# Patient Record
Sex: MALE | Race: WHITE | NOT HISPANIC OR LATINO | Employment: OTHER | URBAN - NONMETROPOLITAN AREA
[De-identification: names, ages, dates, MRNs, and addresses within clinical notes are randomized per-mention and may not be internally consistent; named-entity substitution may affect disease eponyms.]

---

## 2018-12-18 ENCOUNTER — OFFICE VISIT (OUTPATIENT)
Dept: GASTROENTEROLOGY | Facility: CLINIC | Age: 73
End: 2018-12-18

## 2018-12-18 VITALS
OXYGEN SATURATION: 96 % | SYSTOLIC BLOOD PRESSURE: 128 MMHG | HEART RATE: 68 BPM | HEIGHT: 65 IN | DIASTOLIC BLOOD PRESSURE: 62 MMHG | BODY MASS INDEX: 38.79 KG/M2 | WEIGHT: 232.8 LBS

## 2018-12-18 DIAGNOSIS — Z12.11 ENCOUNTER FOR SCREENING FOR MALIGNANT NEOPLASM OF COLON: Primary | ICD-10-CM

## 2018-12-18 PROCEDURE — S0260 H&P FOR SURGERY: HCPCS | Performed by: NURSE PRACTITIONER

## 2018-12-18 RX ORDER — HYDROCHLOROTHIAZIDE 25 MG/1
TABLET ORAL
COMMUNITY
Start: 2018-12-04 | End: 2019-01-21

## 2018-12-18 RX ORDER — TAMSULOSIN HYDROCHLORIDE 0.4 MG/1
CAPSULE ORAL
COMMUNITY
Start: 2018-10-30

## 2018-12-18 RX ORDER — ATORVASTATIN CALCIUM 80 MG/1
80 TABLET, FILM COATED ORAL DAILY
COMMUNITY

## 2018-12-18 RX ORDER — ALLOPURINOL 300 MG/1
TABLET ORAL
COMMUNITY
Start: 2018-12-08

## 2018-12-18 RX ORDER — SODIUM, POTASSIUM,MAG SULFATES 17.5-3.13G
1 SOLUTION, RECONSTITUTED, ORAL ORAL EVERY 12 HOURS
Qty: 2 BOTTLE | Refills: 0 | Status: SHIPPED | OUTPATIENT
Start: 2018-12-18 | End: 2019-01-23 | Stop reason: HOSPADM

## 2018-12-18 RX ORDER — AMLODIPINE BESYLATE 5 MG/1
TABLET ORAL
COMMUNITY
Start: 2018-11-20

## 2018-12-18 RX ORDER — TEMAZEPAM 30 MG/1
30 CAPSULE ORAL NIGHTLY PRN
COMMUNITY

## 2018-12-18 RX ORDER — NITROGLYCERIN 0.4 MG/1
TABLET SUBLINGUAL
COMMUNITY
Start: 2018-11-09

## 2018-12-18 RX ORDER — FINASTERIDE 5 MG/1
TABLET, FILM COATED ORAL
COMMUNITY
Start: 2018-11-14

## 2018-12-18 RX ORDER — WARFARIN SODIUM 2.5 MG/1
TABLET ORAL
COMMUNITY
Start: 2018-12-04

## 2018-12-18 RX ORDER — LOSARTAN POTASSIUM 100 MG/1
TABLET ORAL
COMMUNITY
Start: 2018-10-31

## 2018-12-18 RX ORDER — DEXTROSE AND SODIUM CHLORIDE 5; .45 G/100ML; G/100ML
30 INJECTION, SOLUTION INTRAVENOUS CONTINUOUS PRN
Status: CANCELLED | OUTPATIENT
Start: 2019-01-23

## 2018-12-18 RX ORDER — ATENOLOL 50 MG/1
50 TABLET ORAL 2 TIMES DAILY
COMMUNITY
Start: 2018-10-03

## 2018-12-18 NOTE — PROGRESS NOTES
Chief Complaint   Patient presents with   • Colon Cancer Screening       Subjective    Everett Rodriguez is a 73 y.o. male. he is being seen for consultation today at the request of Dr. Harry     History of Present Illness  73-year-old male presents to discuss screening colonoscopy. he denies any abdominal pain, nausea, vomiting or changes in his bowel habits.  Denies any melena or hematochezia.  His weight is stable.  He is on Coumadin per Dr. Harry due to coronary artery disease.  Initial colonoscopy in 2002 noted adenomatous polyp in rectum.  Repeat colonoscopy 08 was normal.  He denies any family history of colorectal cancer  Plan; schedule patient for screening colonoscopy        The following portions of the patient's history were reviewed and updated as appropriate:   Past Medical History:   Diagnosis Date   • Coronary artery disease    • Diabetes mellitus (CMS/HCC)    • Hyperlipidemia    • Hypertension      Past Surgical History:   Procedure Laterality Date   • CARDIAC CATHETERIZATION     • COLONOSCOPY  2008     No family history on file.    Current Outpatient Medications   Medication Sig Dispense Refill   • allopurinol (ZYLOPRIM) 300 MG tablet      • amLODIPine (NORVASC) 5 MG tablet      • atenolol (TENORMIN) 50 MG tablet      • atorvastatin (LIPITOR) 80 MG tablet Take 80 mg by mouth Daily.     • finasteride (PROSCAR) 5 MG tablet      • hydrochlorothiazide (HYDRODIURIL) 25 MG tablet      • losartan (COZAAR) 100 MG tablet      • metFORMIN (GLUCOPHAGE) 500 MG tablet      • nitroglycerin (NITROSTAT) 0.4 MG SL tablet      • tamsulosin (FLOMAX) 0.4 MG capsule 24 hr capsule      • temazepam (RESTORIL) 30 MG capsule Take 30 mg by mouth At Night As Needed for Sleep.     • warfarin (COUMADIN) 2.5 MG tablet      • sodium-potassium-magnesium sulfates (SUPREP BOWEL PREP KIT) 17.5-3.13-1.6 GM/177ML solution oral solution Take 1 bottle by mouth Every 12 (Twelve) Hours. 2 bottle 0     No current  "facility-administered medications for this visit.      No Known Allergies  Social History     Socioeconomic History   • Marital status: Single     Spouse name: Not on file   • Number of children: Not on file   • Years of education: Not on file   • Highest education level: Not on file       Review of Systems  Review of Systems   Constitutional: Negative for activity change, appetite change, chills, diaphoresis, fatigue, fever and unexpected weight change.   HENT: Negative for sore throat and trouble swallowing.    Respiratory: Negative for shortness of breath.    Gastrointestinal: Negative for abdominal distention, abdominal pain, anal bleeding, blood in stool, constipation, diarrhea, nausea, rectal pain and vomiting.   Musculoskeletal: Negative for arthralgias.   Skin: Negative for pallor.   Neurological: Negative for light-headedness.        /62 (BP Location: Left arm)   Pulse 68   Ht 165.1 cm (65\")   Wt 106 kg (232 lb 12.8 oz)   SpO2 96%   BMI 38.74 kg/m²     Objective    Physical Exam   Constitutional: He is oriented to person, place, and time. He appears well-developed and well-nourished. He is cooperative. No distress.   HENT:   Head: Normocephalic and atraumatic.   Neck: Normal range of motion. Neck supple. No thyromegaly present.   Cardiovascular: Normal rate and regular rhythm.   Murmur heard.  Pulmonary/Chest: Effort normal and breath sounds normal. He has no wheezes. He has no rhonchi. He has no rales.   Abdominal: Soft. Normal appearance and bowel sounds are normal. He exhibits no shifting dullness, no distension, no fluid wave and no ascites. There is no hepatosplenomegaly. There is no tenderness. There is no rigidity and no guarding. No hernia.   Lymphadenopathy:     He has no cervical adenopathy.   Neurological: He is alert and oriented to person, place, and time.   Skin: Skin is warm, dry and intact. No rash noted. No pallor.   Psychiatric: He has a normal mood and affect. His speech is " normal.     Hospital Outpatient Visit on 09/11/2014   Component Date Value Ref Range Status   • Color, UA 09/11/2014 RED   Final   • Appearance 09/11/2014 TURBID   Final   • Specific Gravity, UA 09/11/2014 1.019  1.003 - 1.030 Final   • pH, UA 09/11/2014 6.0  pH Units Final    Comment: DF by IF @ 09/11/2014 15:13  pH Normal: 5.0 - 9.0      • Leukocytes, UA 09/11/2014 3+* NEGATIVE Final   • Nitrite, UA 09/11/2014 POSITIVE* NEGATIVE Final   • Protein, UA 09/11/2014 3+* NEGATIVE Final   • Glucose, Urine 09/11/2014 NEGATIVE  NEGATIVE mg/dl Final   • Ketones, UA 09/11/2014 2+* NEGATIVE Final   • Urobilinogen, UA 09/11/2014 1.0* 0.2 EU/dl Final   • Blood, UA 09/11/2014 3+* NEGATIVE Final   • WBC, UA 09/11/2014 50-60* 0 - 5  /HPF Final   • RBC, UA 09/11/2014 >100* 0 - 2  /HPF Final   • Bacteria, UA 09/11/2014 TRACE* 0  /HPF Final   • Mucus, UA 09/11/2014 TRACE   Final   • Epithelial Cells, UA 09/11/2014 Rare Squamous   /HPF Final   • Crystals, Joint Fluid 09/11/2014 None Observed   /HPF Final   • Casts 09/11/2014 0   /LPF Final   • Sodium 09/11/2014 142  137 - 145 mmol/L Final   • Potassium 09/11/2014 4.2  3.5 - 5.1 mmol/L Final   • Chloride 09/11/2014 96  95 - 110 mmol/L Final   • CO2 09/11/2014 32* 22 - 31 mmol/L Final   • Anion Gap 09/11/2014 14.0  5.0 - 15.0 mmol/L Final   • Glucose 09/11/2014 147* 60 - 100 mg/dl Final   • BUN 09/11/2014 32* 7 - 21 mg/dl Final   • Creatinine 09/11/2014 1.2  0.7 - 1.3 mg/dl Final   • GFR MDRD Non  09/11/2014 60  49 - 113 mL/min/1.73 sq.M Final    Comment: Invalid if creatinine is changing or the patient is on dialysis.  Use AA result if patient is -American, non AA result otherwise.     • GFR MDRD  09/11/2014 73  49 - 113 mL/min/1.73 sq.M Final   • Calcium 09/11/2014 9.8  8.4 - 10.2 mg/dl Final   • Total Protein 09/11/2014 7.7  6.3 - 8.6 gm/dl Final   • Albumin 09/11/2014 4.1  3.4 - 4.8 gm/dl Final   • Total Bilirubin 09/11/2014 1.1  0.2 - 1.3  mg/dl Final   • Alkaline Phosphatase 09/11/2014 176* 38 - 126 U/L Final   • AST (SGOT) 09/11/2014 98* 17 - 59 U/L Final   • ALT (SGPT) 09/11/2014 89* 21 - 72 U/L Final   • WBC 09/11/2014 13.8* 3.2 - 9.8 x1000/uL Final   • RBC 09/11/2014 4.58  4.37 - 5.74 juan/mm3 Final   • Hemoglobin 09/11/2014 14.6  13.7 - 17.3 gm/dl Final   • Hematocrit 09/11/2014 41.4  39.0 - 49.0 % Final   • MCV 09/11/2014 90.4  80.0 - 98.0 fl Final   • MCH 09/11/2014 31.9  26.0 - 34.0 pg Final   • MCHC 09/11/2014 35.3  31.5 - 36.3 gm/dl Final   • RDW 09/11/2014 13.0  11.5 - 14.5 % Final   • Platelets 09/11/2014 131* 150 - 450 x1000/mm3 Final   • MPV 09/11/2014 10.8  8.0 - 12.0 fl Final   • Neutrophil Rel % 09/11/2014 80.6* 37.0 - 80.0 % Final   • Lymphocyte Rel % 09/11/2014 8.9* 10.0 - 50.0 % Final   • Monocyte Rel % 09/11/2014 7.8  0.0 - 12.0 % Final   • Eosinophil Rel % 09/11/2014 1.6  0.0 - 7.0 % Final   • Basophil Rel % 09/11/2014 0.4  0.0 - 2.0 % Final   • Immature Granulocyte Rel % 09/11/2014 0.70* 0.00 - 0.50 % Final   • Neutrophils Absolute 09/11/2014 11.13* 2.00 - 8.60 x1000/uL Final   • Lymphocytes Absolute 09/11/2014 1.23  0.60 - 4.20 x1000/uL Final   • Monocytes Absolute 09/11/2014 1.08* 0.00 - 0.90 x1000/uL Final   • Eosinophils Absolute 09/11/2014 0.22  0.00 - 0.70 x1000/uL Final   • Basophils Absolute 09/11/2014 0.05  0.00 - 0.20 x1000/uL Final   • Immature Granulocytes Absolute 09/11/2014 0.090* 0.005 - 0.022 x1000/uL Final   • PTT 09/11/2014 30.2  22.5 - 36.7 seconds Final    Comment: DF by IF @ 09/11/2014 15:28  The recommended Heparin therapeutic range is 68 - 97 seconds.     • Protime 09/11/2014 13.9  11.1 - 15.3 seconds Final   • INR 09/11/2014 1.1  0.0 - 3.5 Final                                   Assessment/Plan      1. Encounter for screening for malignant neoplasm of colon    .       Orders placed during this encounter include:  Orders Placed This Encounter   Procedures   • Follow Anesthesia Guidelines / Standing Orders      Standing Status:   Future       COLONOSCOPY (N/A)    Review and/or summary of lab tests, radiology, procedures, medications. Review and summary of old records and obtaining of history. The risks and benefits of my recommendations, as well as other treatment options were discussed with the patient today. Questions were answered.    New Medications Ordered This Visit   Medications   • sodium-potassium-magnesium sulfates (SUPREP BOWEL PREP KIT) 17.5-3.13-1.6 GM/177ML solution oral solution     Sig: Take 1 bottle by mouth Every 12 (Twelve) Hours.     Dispense:  2 bottle     Refill:  0       Follow-up: Return for Recheck after Procedure .          This document has been electronically signed by YOHAN Spring on December 18, 2018 11:06 AM             Results for orders placed or performed during the hospital encounter of 09/11/14   Urinalysis, Microscopic only   Result Value Ref Range    WBC, UA 50-60 (A) 0 - 5  /HPF    RBC, UA >100 (A) 0 - 2  /HPF    Bacteria, UA TRACE (A) 0  /HPF    Mucus, UA TRACE     Epithelial Cells, UA Rare Squamous  /HPF    Crystals, Joint Fluid None Observed  /HPF    Casts 0  /LPF   Urinalysis Without Microscopic   Result Value Ref Range    Color, UA RED     Appearance TURBID     Specific Gravity, UA 1.019 1.003 - 1.030    pH, UA 6.0 pH Units    Leukocytes, UA 3+ (A) NEGATIVE    Nitrite, UA POSITIVE (A) NEGATIVE    Protein, UA 3+ (A) NEGATIVE    Glucose, Urine NEGATIVE NEGATIVE mg/dl    Ketones, UA 2+ (A) NEGATIVE    Urobilinogen, UA 1.0 (A) 0.2 EU/dl    Blood, UA 3+ (A) NEGATIVE   APTT   Result Value Ref Range    PTT 30.2 22.5 - 36.7 seconds   Protime-INR   Result Value Ref Range    Protime 13.9 11.1 - 15.3 seconds    INR 1.1 0.0 - 3.5   CBC and Differential   Result Value Ref Range    WBC 13.8 (H) 3.2 - 9.8 x1000/uL    RBC 4.58 4.37 - 5.74 juan/mm3    Hemoglobin 14.6 13.7 - 17.3 gm/dl    Hematocrit 41.4 39.0 - 49.0 %    MCV 90.4 80.0 - 98.0 fl    MCH 31.9 26.0 - 34.0 pg    MCHC 35.3  31.5 - 36.3 gm/dl    RDW 13.0 11.5 - 14.5 %    Platelets 131 (L) 150 - 450 x1000/mm3    MPV 10.8 8.0 - 12.0 fl    Neutrophil Rel % 80.6 (H) 37.0 - 80.0 %    Lymphocyte Rel % 8.9 (L) 10.0 - 50.0 %    Monocyte Rel % 7.8 0.0 - 12.0 %    Eosinophil Rel % 1.6 0.0 - 7.0 %    Basophil Rel % 0.4 0.0 - 2.0 %    Immature Granulocyte Rel % 0.70 (H) 0.00 - 0.50 %    Neutrophils Absolute 11.13 (H) 2.00 - 8.60 x1000/uL    Lymphocytes Absolute 1.23 0.60 - 4.20 x1000/uL    Monocytes Absolute 1.08 (H) 0.00 - 0.90 x1000/uL    Eosinophils Absolute 0.22 0.00 - 0.70 x1000/uL    Basophils Absolute 0.05 0.00 - 0.20 x1000/uL    Immature Granulocytes Absolute 0.090 (H) 0.005 - 0.022 x1000/uL   Comprehensive metabolic panel   Result Value Ref Range    Sodium 142 137 - 145 mmol/L    Potassium 4.2 3.5 - 5.1 mmol/L    Chloride 96 95 - 110 mmol/L    CO2 32 (H) 22 - 31 mmol/L    Anion Gap 14.0 5.0 - 15.0 mmol/L    Glucose 147 (H) 60 - 100 mg/dl    BUN 32 (H) 7 - 21 mg/dl    Creatinine 1.2 0.7 - 1.3 mg/dl    GFR MDRD Non  60 49 - 113 mL/min/1.73 sq.M    GFR MDRD  73 49 - 113 mL/min/1.73 sq.M    Calcium 9.8 8.4 - 10.2 mg/dl    Total Protein 7.7 6.3 - 8.6 gm/dl    Albumin 4.1 3.4 - 4.8 gm/dl    Total Bilirubin 1.1 0.2 - 1.3 mg/dl    Alkaline Phosphatase 176 (H) 38 - 126 U/L    AST (SGOT) 98 (H) 17 - 59 U/L    ALT (SGPT) 89 (H) 21 - 72 U/L   Results for orders placed or performed in visit on 05/23/14   Converted Surgical Pathology   Result Value Ref Range    Spec Descr 1 SPECIMEN(S): A LESION LEFT INFRAORBITAL     Preoperative Diagnosis   PREOPERATIVE DIAGNOSIS:  None Given       Postoperative Diagnosis         POSTOPERATIVE DIAGNOSIS:  (L) infraorbital skin lesion      Gross Description         GROSS DESCRIPTION:  The specimen consists of an oval shaped skin from the left infraorbital  area measuring 2.0 x 1.5 cm and cut to a depth of 1.0 cm.  The skin  surface shows a raised firm grayish tan nodule measuring 1.0 cm  "in  greatest dimension and is about 0.2 cm away from the closest line of  excision.  Serially sectioned and all embedded.      Final Diagnosis         FINAL DIAGNOSIS:  SKIN, LEFT INFRAORBITAL AREA, EXCISIONAL BIOPSY:       SEBACEOUS ADENOMA.      CONVERTED (HISTORICAL) FINAL PATHOLOGIST       Diagnostician:  CHRISTIANO JOHN M.D.  Pathologist  Electronically Signed 05/28/2014      Diagnosis Code   DIAGNOSIS CODE:  8      Results for orders placed or performed in visit on 03/01/05   Converted Surgical Pathology   Result Value Ref Range    Spec Descr 1 SPECIMEN(S): A CYST     Gross Description         GROSS DESCRIPTION:  The specimen is labeled \"#1 cyst, right thumb\" and consists of a  fusiform skin fragment measuring 1.3 x 0.6 x 0.4 cm.  This is serially  sectioned and entirely submitted in 1 block after the margins are marked  with ink.  Two fragments of gray-tan tissue measure 0.5 x 0.4 x 0.3 cm  each.  These are included without sectioning in the same block.      Final Diagnosis         FINAL DIAGNOSIS:  SKIN, RIGHT THUMB:            DIGITAL MUCOUS CYST.                           [facility]    DIAGNOSIS CODE:  3      Comment   CLINICAL DIAGNOSIS:  Cyst       CONVERTED (HISTORICAL) FINAL PATHOLOGIST       Diagnostician:  LORY ARMAS M.D.  Pathologist  Electronically Signed 03/03/2005     Results for orders placed or performed in visit on 10/03/02   Converted Surgical Pathology   Result Value Ref Range    Spec Descr 1 SPECIMEN(S): A RECTAL BIOPSY     Gross Description         GROSS DESCRIPTION:  The container is labeled \"rectum\" and has a fragment of white soft  tissue 0.4 cm in greatest dimension.  It is entirely embedded.      Final Diagnosis         FINAL DIAGNOSIS:  MUCOSA, RECTUM:              TUBULAR ADENOMA.            pgl    DIAGNOSIS CODE:  8      Comment   CLINICAL DIAGNOSIS:  Rectal biopsy       CONVERTED (HISTORICAL) FINAL PATHOLOGIST       Diagnostician:  DEE JOY M.D.  Pathologist  Electronically " Signed 10/07/2002

## 2018-12-18 NOTE — PATIENT INSTRUCTIONS
Colonoscopy, Adult  A colonoscopy is an exam to look at the entire large intestine. During the exam, a lubricated, bendable tube is inserted into the anus and then passed into the rectum, colon, and other parts of the large intestine.  A colonoscopy is often done as a part of normal colorectal screening or in response to certain symptoms, such as anemia, persistent diarrhea, abdominal pain, and blood in the stool. The exam can help screen for and diagnose medical problems, including:  · Tumors.  · Polyps.  · Inflammation.  · Areas of bleeding.    Tell a health care provider about:  · Any allergies you have.  · All medicines you are taking, including vitamins, herbs, eye drops, creams, and over-the-counter medicines.  · Any problems you or family members have had with anesthetic medicines.  · Any blood disorders you have.  · Any surgeries you have had.  · Any medical conditions you have.  · Any problems you have had passing stool.  What are the risks?  Generally, this is a safe procedure. However, problems may occur, including:  · Bleeding.  · A tear in the intestine.  · A reaction to medicines given during the exam.  · Infection (rare).    What happens before the procedure?  Eating and drinking restrictions  Follow instructions from your health care provider about eating and drinking, which may include:  · A few days before the procedure - follow a low-fiber diet. Avoid nuts, seeds, dried fruit, raw fruits, and vegetables.  · 1-3 days before the procedure - follow a clear liquid diet. Drink only clear liquids, such as clear broth or bouillon, black coffee or tea, clear juice, clear soft drinks or sports drinks, gelatin dessert, and popsicles. Avoid any liquids that contain red or purple dye.  · On the day of the procedure - do not eat or drink anything during the 2 hours before the procedure, or within the time period that your health care provider recommends.    Bowel prep  If you were prescribed an oral bowel prep  to clean out your colon:  · Take it as told by your health care provider. Starting the day before your procedure, you will need to drink a large amount of medicated liquid. The liquid will cause you to have multiple loose stools until your stool is almost clear or light green.  · If your skin or anus gets irritated from diarrhea, you may use these to relieve the irritation:  ? Medicated wipes, such as adult wet wipes with aloe and vitamin E.  ? A skin soothing-product like petroleum jelly.  · If you vomit while drinking the bowel prep, take a break for up to 60 minutes and then begin the bowel prep again. If vomiting continues and you cannot take the bowel prep without vomiting, call your health care provider.    General instructions  · Ask your health care provider about changing or stopping your regular medicines. This is especially important if you are taking diabetes medicines or blood thinners.  · Plan to have someone take you home from the hospital or clinic.  What happens during the procedure?  · An IV tube may be inserted into one of your veins.  · You will be given medicine to help you relax (sedative).  · To reduce your risk of infection:  ? Your health care team will wash or sanitize their hands.  ? Your anal area will be washed with soap.  · You will be asked to lie on your side with your knees bent.  · Your health care provider will lubricate a long, thin, flexible tube. The tube will have a camera and a light on the end.  · The tube will be inserted into your anus.  · The tube will be gently eased through your rectum and colon.  · Air will be delivered into your colon to keep it open. You may feel some pressure or cramping.  · The camera will be used to take images during the procedure.  · A small tissue sample may be removed from your body to be examined under a microscope (biopsy). If any potential problems are found, the tissue will be sent to a lab for testing.  · If small polyps are found, your  health care provider may remove them and have them checked for cancer cells.  · The tube that was inserted into your anus will be slowly removed.  The procedure may vary among health care providers and hospitals.  What happens after the procedure?  · Your blood pressure, heart rate, breathing rate, and blood oxygen level will be monitored until the medicines you were given have worn off.  · Do not drive for 24 hours after the exam.  · You may have a small amount of blood in your stool.  · You may pass gas and have mild abdominal cramping or bloating due to the air that was used to inflate your colon during the exam.  · It is up to you to get the results of your procedure. Ask your health care provider, or the department performing the procedure, when your results will be ready.  This information is not intended to replace advice given to you by your health care provider. Make sure you discuss any questions you have with your health care provider.  Document Released: 12/15/2001 Document Revised: 10/18/2017 Document Reviewed: 02/28/2017  [employer] Patient Education © 2018 [employer]

## 2019-01-21 RX ORDER — HYDROCHLOROTHIAZIDE 25 MG/1
25 TABLET ORAL DAILY
COMMUNITY

## 2019-01-21 RX ORDER — HYDRALAZINE HYDROCHLORIDE 50 MG/1
50 TABLET, FILM COATED ORAL 3 TIMES DAILY
COMMUNITY

## 2019-01-23 ENCOUNTER — HOSPITAL ENCOUNTER (OUTPATIENT)
Facility: HOSPITAL | Age: 74
Setting detail: HOSPITAL OUTPATIENT SURGERY
Discharge: HOME | End: 2019-01-23
Attending: INTERNAL MEDICINE | Admitting: INTERNAL MEDICINE

## 2019-01-23 ENCOUNTER — ANESTHESIA EVENT (OUTPATIENT)
Dept: GASTROENTEROLOGY | Facility: HOSPITAL | Age: 74
End: 2019-01-23

## 2019-01-23 ENCOUNTER — ANESTHESIA (OUTPATIENT)
Dept: GASTROENTEROLOGY | Facility: HOSPITAL | Age: 74
End: 2019-01-23

## 2019-01-23 VITALS
HEIGHT: 65 IN | BODY MASS INDEX: 37.55 KG/M2 | HEART RATE: 75 BPM | SYSTOLIC BLOOD PRESSURE: 134 MMHG | TEMPERATURE: 98.1 F | OXYGEN SATURATION: 95 % | RESPIRATION RATE: 19 BRPM | DIASTOLIC BLOOD PRESSURE: 65 MMHG | WEIGHT: 225.38 LBS

## 2019-01-23 DIAGNOSIS — Z12.11 ENCOUNTER FOR SCREENING FOR MALIGNANT NEOPLASM OF COLON: ICD-10-CM

## 2019-01-23 LAB — GLUCOSE BLDC GLUCOMTR-MCNC: 112 MG/DL (ref 70–130)

## 2019-01-23 PROCEDURE — 88305 TISSUE EXAM BY PATHOLOGIST: CPT | Performed by: PATHOLOGY

## 2019-01-23 PROCEDURE — 88305 TISSUE EXAM BY PATHOLOGIST: CPT | Performed by: INTERNAL MEDICINE

## 2019-01-23 PROCEDURE — 82962 GLUCOSE BLOOD TEST: CPT

## 2019-01-23 PROCEDURE — 45385 COLONOSCOPY W/LESION REMOVAL: CPT | Performed by: INTERNAL MEDICINE

## 2019-01-23 PROCEDURE — 25010000002 PROPOFOL 10 MG/ML EMULSION: Performed by: NURSE ANESTHETIST, CERTIFIED REGISTERED

## 2019-01-23 RX ORDER — DEXTROSE AND SODIUM CHLORIDE 5; .45 G/100ML; G/100ML
30 INJECTION, SOLUTION INTRAVENOUS CONTINUOUS PRN
Status: DISCONTINUED | OUTPATIENT
Start: 2019-01-23 | End: 2019-01-23 | Stop reason: HOSPADM

## 2019-01-23 RX ORDER — PROPOFOL 10 MG/ML
VIAL (ML) INTRAVENOUS AS NEEDED
Status: DISCONTINUED | OUTPATIENT
Start: 2019-01-23 | End: 2019-01-23 | Stop reason: SURG

## 2019-01-23 RX ADMIN — PROPOFOL 40 MG: 10 INJECTION, EMULSION INTRAVENOUS at 15:36

## 2019-01-23 RX ADMIN — PROPOFOL 40 MG: 10 INJECTION, EMULSION INTRAVENOUS at 15:28

## 2019-01-23 RX ADMIN — PROPOFOL 60 MG: 10 INJECTION, EMULSION INTRAVENOUS at 15:24

## 2019-01-23 RX ADMIN — PROPOFOL 40 MG: 10 INJECTION, EMULSION INTRAVENOUS at 15:32

## 2019-01-23 RX ADMIN — DEXTROSE AND SODIUM CHLORIDE 30 ML/HR: 5; 450 INJECTION, SOLUTION INTRAVENOUS at 15:03

## 2019-01-23 NOTE — H&P
No chief complaint on file.      Subjective    Everett Rodriguez is a 73 y.o. male. he is being seen for consultation today at the request of Dr. Harry     History of Present Illness  73-year-old male presents to discuss screening colonoscopy. he denies any abdominal pain, nausea, vomiting or changes in his bowel habits.  Denies any melena or hematochezia.  His weight is stable.  He is on Coumadin per Dr. Harry due to coronary artery disease.  Initial colonoscopy in 2002 noted adenomatous polyp in rectum.  Repeat colonoscopy 08 was normal.  He denies any family history of colorectal cancer  Plan; schedule patient for screening colonoscopy        The following portions of the patient's history were reviewed and updated as appropriate:   Past Medical History:   Diagnosis Date   • Coronary artery disease    • Diabetes mellitus (CMS/HCC)    • Hyperlipidemia    • Hypertension      Past Surgical History:   Procedure Laterality Date   • CARDIAC CATHETERIZATION     • COLONOSCOPY  2008     History reviewed. No pertinent family history.    No current facility-administered medications for this encounter.      No Known Allergies  Social History     Socioeconomic History   • Marital status: Single     Spouse name: Not on file   • Number of children: Not on file   • Years of education: Not on file   • Highest education level: Not on file   Tobacco Use   • Smoking status: Never Smoker   • Smokeless tobacco: Never Used   Substance and Sexual Activity   • Alcohol use: Yes     Comment: occasional   • Drug use: No     Prior to Admission medications    Medication Sig Start Date End Date Taking? Authorizing Provider   allopurinol (ZYLOPRIM) 300 MG tablet  12/8/18  Yes ProviderTanner MD   amLODIPine (NORVASC) 5 MG tablet  11/20/18  Yes ProviderTanner MD   atenolol (TENORMIN) 50 MG tablet Take 50 mg by mouth 2 (Two) Times a Day. 10/3/18  Yes ProviderTanner MD   atorvastatin (LIPITOR) 80 MG tablet Take 80 mg  "by mouth Daily.   Yes ProviderTanner MD   finasteride (PROSCAR) 5 MG tablet  11/14/18  Yes ProviderTanner MD   hydrALAZINE (APRESOLINE) 50 MG tablet Take 50 mg by mouth 3 (Three) Times a Day.   Yes Provider, MD Tanner   hydrochlorothiazide (HYDRODIURIL) 25 MG tablet Take 25 mg by mouth Daily.   Yes Tanner Barrientos MD   losartan (COZAAR) 100 MG tablet  10/31/18  Yes Tanner Barrientos MD   metFORMIN (GLUCOPHAGE) 500 MG tablet  12/8/18  Yes Provider, MD Tanner   tamsulosin (FLOMAX) 0.4 MG capsule 24 hr capsule  10/30/18  Yes Tanner Barrientos MD   temazepam (RESTORIL) 30 MG capsule Take 30 mg by mouth At Night As Needed for Sleep.   Yes Tanner Barrientos MD   nitroglycerin (NITROSTAT) 0.4 MG SL tablet  11/9/18   Tanner Barrientos MD   warfarin (COUMADIN) 2.5 MG tablet  12/4/18   ProviderTanner MD       Review of Systems  Review of Systems   Constitutional: Negative for activity change, appetite change, chills, diaphoresis, fatigue, fever and unexpected weight change.   HENT: Negative for sore throat and trouble swallowing.    Respiratory: Negative for shortness of breath.    Gastrointestinal: Negative for abdominal distention, abdominal pain, anal bleeding, blood in stool, constipation, diarrhea, nausea, rectal pain and vomiting.   Musculoskeletal: Negative for arthralgias.   Skin: Negative for pallor.   Neurological: Negative for light-headedness.        Ht 165.1 cm (65\")   Wt 104 kg (230 lb)   BMI 38.27 kg/m²     Objective    Physical Exam   Constitutional: He is oriented to person, place, and time. He appears well-developed and well-nourished. He is cooperative. No distress.   HENT:   Head: Normocephalic and atraumatic.   Neck: Normal range of motion. Neck supple. No thyromegaly present.   Cardiovascular: Normal rate and regular rhythm.   Murmur heard.  Pulmonary/Chest: Effort normal and breath sounds normal. He has no wheezes. He has no rhonchi. He has no " rales.   Abdominal: Soft. Normal appearance and bowel sounds are normal. He exhibits no shifting dullness, no distension, no fluid wave and no ascites. There is no hepatosplenomegaly. There is no tenderness. There is no rigidity and no guarding. No hernia.   Lymphadenopathy:     He has no cervical adenopathy.   Neurological: He is alert and oriented to person, place, and time.   Skin: Skin is warm, dry and intact. No rash noted. No pallor.   Psychiatric: He has a normal mood and affect. His speech is normal.     Hospital Outpatient Visit on 09/11/2014   Component Date Value Ref Range Status   • Color, UA 09/11/2014 RED   Final   • Appearance 09/11/2014 TURBID   Final   • Specific Gravity, UA 09/11/2014 1.019  1.003 - 1.030 Final   • pH, UA 09/11/2014 6.0  pH Units Final    Comment: DF by IF @ 09/11/2014 15:13  pH Normal: 5.0 - 9.0      • Leukocytes, UA 09/11/2014 3+* NEGATIVE Final   • Nitrite, UA 09/11/2014 POSITIVE* NEGATIVE Final   • Protein, UA 09/11/2014 3+* NEGATIVE Final   • Glucose, Urine 09/11/2014 NEGATIVE  NEGATIVE mg/dl Final   • Ketones, UA 09/11/2014 2+* NEGATIVE Final   • Urobilinogen, UA 09/11/2014 1.0* 0.2 EU/dl Final   • Blood, UA 09/11/2014 3+* NEGATIVE Final   • WBC, UA 09/11/2014 50-60* 0 - 5  /HPF Final   • RBC, UA 09/11/2014 >100* 0 - 2  /HPF Final   • Bacteria, UA 09/11/2014 TRACE* 0  /HPF Final   • Mucus, UA 09/11/2014 TRACE   Final   • Epithelial Cells, UA 09/11/2014 Rare Squamous   /HPF Final   • Crystals, Joint Fluid 09/11/2014 None Observed   /HPF Final   • Casts 09/11/2014 0   /LPF Final   • Sodium 09/11/2014 142  137 - 145 mmol/L Final   • Potassium 09/11/2014 4.2  3.5 - 5.1 mmol/L Final   • Chloride 09/11/2014 96  95 - 110 mmol/L Final   • CO2 09/11/2014 32* 22 - 31 mmol/L Final   • Anion Gap 09/11/2014 14.0  5.0 - 15.0 mmol/L Final   • Glucose 09/11/2014 147* 60 - 100 mg/dl Final   • BUN 09/11/2014 32* 7 - 21 mg/dl Final   • Creatinine 09/11/2014 1.2  0.7 - 1.3 mg/dl Final   • GFR  MDRD Non  09/11/2014 60  49 - 113 mL/min/1.73 sq.M Final    Comment: Invalid if creatinine is changing or the patient is on dialysis.  Use AA result if patient is -American, non AA result otherwise.     • GFR MDRD  09/11/2014 73  49 - 113 mL/min/1.73 sq.M Final   • Calcium 09/11/2014 9.8  8.4 - 10.2 mg/dl Final   • Total Protein 09/11/2014 7.7  6.3 - 8.6 gm/dl Final   • Albumin 09/11/2014 4.1  3.4 - 4.8 gm/dl Final   • Total Bilirubin 09/11/2014 1.1  0.2 - 1.3 mg/dl Final   • Alkaline Phosphatase 09/11/2014 176* 38 - 126 U/L Final   • AST (SGOT) 09/11/2014 98* 17 - 59 U/L Final   • ALT (SGPT) 09/11/2014 89* 21 - 72 U/L Final   • WBC 09/11/2014 13.8* 3.2 - 9.8 x1000/uL Final   • RBC 09/11/2014 4.58  4.37 - 5.74 juan/mm3 Final   • Hemoglobin 09/11/2014 14.6  13.7 - 17.3 gm/dl Final   • Hematocrit 09/11/2014 41.4  39.0 - 49.0 % Final   • MCV 09/11/2014 90.4  80.0 - 98.0 fl Final   • MCH 09/11/2014 31.9  26.0 - 34.0 pg Final   • MCHC 09/11/2014 35.3  31.5 - 36.3 gm/dl Final   • RDW 09/11/2014 13.0  11.5 - 14.5 % Final   • Platelets 09/11/2014 131* 150 - 450 x1000/mm3 Final   • MPV 09/11/2014 10.8  8.0 - 12.0 fl Final   • Neutrophil Rel % 09/11/2014 80.6* 37.0 - 80.0 % Final   • Lymphocyte Rel % 09/11/2014 8.9* 10.0 - 50.0 % Final   • Monocyte Rel % 09/11/2014 7.8  0.0 - 12.0 % Final   • Eosinophil Rel % 09/11/2014 1.6  0.0 - 7.0 % Final   • Basophil Rel % 09/11/2014 0.4  0.0 - 2.0 % Final   • Immature Granulocyte Rel % 09/11/2014 0.70* 0.00 - 0.50 % Final   • Neutrophils Absolute 09/11/2014 11.13* 2.00 - 8.60 x1000/uL Final   • Lymphocytes Absolute 09/11/2014 1.23  0.60 - 4.20 x1000/uL Final   • Monocytes Absolute 09/11/2014 1.08* 0.00 - 0.90 x1000/uL Final   • Eosinophils Absolute 09/11/2014 0.22  0.00 - 0.70 x1000/uL Final   • Basophils Absolute 09/11/2014 0.05  0.00 - 0.20 x1000/uL Final   • Immature Granulocytes Absolute 09/11/2014 0.090* 0.005 - 0.022 x1000/uL Final   • PTT  09/11/2014 30.2  22.5 - 36.7 seconds Final    Comment: DF by IF @ 09/11/2014 15:28  The recommended Heparin therapeutic range is 68 - 97 seconds.     • Protime 09/11/2014 13.9  11.1 - 15.3 seconds Final   • INR 09/11/2014 1.1  0.0 - 3.5 Final                                   Assessment/Plan      No diagnosis found..       Orders placed during this encounter include:  No orders of the defined types were placed in this encounter.      COLONOSCOPY (N/A)    Review and/or summary of lab tests, radiology, procedures, medications. Review and summary of old records and obtaining of history. The risks and benefits of my recommendations, as well as other treatment options were discussed with the patient today. Questions were answered.    No orders of the defined types were placed in this encounter.      Follow-up: No Follow-up on file.          This document has been electronically signed by Marquez Art MD on January 23, 2019 2:23 PM             Results for orders placed or performed during the hospital encounter of 09/11/14   Urinalysis, Microscopic only   Result Value Ref Range    WBC, UA 50-60 (A) 0 - 5  /HPF    RBC, UA >100 (A) 0 - 2  /HPF    Bacteria, UA TRACE (A) 0  /HPF    Mucus, UA TRACE     Epithelial Cells, UA Rare Squamous  /HPF    Crystals, Joint Fluid None Observed  /HPF    Casts 0  /LPF   Urinalysis Without Microscopic   Result Value Ref Range    Color, UA RED     Appearance TURBID     Specific Gravity, UA 1.019 1.003 - 1.030    pH, UA 6.0 pH Units    Leukocytes, UA 3+ (A) NEGATIVE    Nitrite, UA POSITIVE (A) NEGATIVE    Protein, UA 3+ (A) NEGATIVE    Glucose, Urine NEGATIVE NEGATIVE mg/dl    Ketones, UA 2+ (A) NEGATIVE    Urobilinogen, UA 1.0 (A) 0.2 EU/dl    Blood, UA 3+ (A) NEGATIVE   APTT   Result Value Ref Range    PTT 30.2 22.5 - 36.7 seconds   Protime-INR   Result Value Ref Range    Protime 13.9 11.1 - 15.3 seconds    INR 1.1 0.0 - 3.5   CBC and Differential   Result Value Ref Range    WBC 13.8 (H)  3.2 - 9.8 x1000/uL    RBC 4.58 4.37 - 5.74 juan/mm3    Hemoglobin 14.6 13.7 - 17.3 gm/dl    Hematocrit 41.4 39.0 - 49.0 %    MCV 90.4 80.0 - 98.0 fl    MCH 31.9 26.0 - 34.0 pg    MCHC 35.3 31.5 - 36.3 gm/dl    RDW 13.0 11.5 - 14.5 %    Platelets 131 (L) 150 - 450 x1000/mm3    MPV 10.8 8.0 - 12.0 fl    Neutrophil Rel % 80.6 (H) 37.0 - 80.0 %    Lymphocyte Rel % 8.9 (L) 10.0 - 50.0 %    Monocyte Rel % 7.8 0.0 - 12.0 %    Eosinophil Rel % 1.6 0.0 - 7.0 %    Basophil Rel % 0.4 0.0 - 2.0 %    Immature Granulocyte Rel % 0.70 (H) 0.00 - 0.50 %    Neutrophils Absolute 11.13 (H) 2.00 - 8.60 x1000/uL    Lymphocytes Absolute 1.23 0.60 - 4.20 x1000/uL    Monocytes Absolute 1.08 (H) 0.00 - 0.90 x1000/uL    Eosinophils Absolute 0.22 0.00 - 0.70 x1000/uL    Basophils Absolute 0.05 0.00 - 0.20 x1000/uL    Immature Granulocytes Absolute 0.090 (H) 0.005 - 0.022 x1000/uL   Comprehensive metabolic panel   Result Value Ref Range    Sodium 142 137 - 145 mmol/L    Potassium 4.2 3.5 - 5.1 mmol/L    Chloride 96 95 - 110 mmol/L    CO2 32 (H) 22 - 31 mmol/L    Anion Gap 14.0 5.0 - 15.0 mmol/L    Glucose 147 (H) 60 - 100 mg/dl    BUN 32 (H) 7 - 21 mg/dl    Creatinine 1.2 0.7 - 1.3 mg/dl    GFR MDRD Non  60 49 - 113 mL/min/1.73 sq.M    GFR MDRD  73 49 - 113 mL/min/1.73 sq.M    Calcium 9.8 8.4 - 10.2 mg/dl    Total Protein 7.7 6.3 - 8.6 gm/dl    Albumin 4.1 3.4 - 4.8 gm/dl    Total Bilirubin 1.1 0.2 - 1.3 mg/dl    Alkaline Phosphatase 176 (H) 38 - 126 U/L    AST (SGOT) 98 (H) 17 - 59 U/L    ALT (SGPT) 89 (H) 21 - 72 U/L   Results for orders placed or performed in visit on 05/23/14   Converted Surgical Pathology   Result Value Ref Range    Spec Descr 1 SPECIMEN(S): A LESION LEFT INFRAORBITAL     Preoperative Diagnosis   PREOPERATIVE DIAGNOSIS:  None Given       Postoperative Diagnosis         POSTOPERATIVE DIAGNOSIS:  (L) infraorbital skin lesion      Gross Description         GROSS DESCRIPTION:  The specimen  "consists of an oval shaped skin from the left infraorbital  area measuring 2.0 x 1.5 cm and cut to a depth of 1.0 cm.  The skin  surface shows a raised firm grayish tan nodule measuring 1.0 cm in  greatest dimension and is about 0.2 cm away from the closest line of  excision.  Serially sectioned and all embedded.      Final Diagnosis         FINAL DIAGNOSIS:  SKIN, LEFT INFRAORBITAL AREA, EXCISIONAL BIOPSY:       SEBACEOUS ADENOMA.      CONVERTED (HISTORICAL) FINAL PATHOLOGIST       Diagnostician:  CHRISTIANO JOHN M.D.  Pathologist  Electronically Signed 05/28/2014      Diagnosis Code   DIAGNOSIS CODE:  8      Results for orders placed or performed in visit on 03/01/05   Converted Surgical Pathology   Result Value Ref Range    Spec Descr 1 SPECIMEN(S): A CYST     Gross Description         GROSS DESCRIPTION:  The specimen is labeled \"#1 cyst, right thumb\" and consists of a  fusiform skin fragment measuring 1.3 x 0.6 x 0.4 cm.  This is serially  sectioned and entirely submitted in 1 block after the margins are marked  with ink.  Two fragments of gray-tan tissue measure 0.5 x 0.4 x 0.3 cm  each.  These are included without sectioning in the same block.      Final Diagnosis         FINAL DIAGNOSIS:  SKIN, RIGHT THUMB:            DIGITAL MUCOUS CYST.                           [facility] [place]    DIAGNOSIS CODE:  3      Comment   CLINICAL DIAGNOSIS:  Cyst       CONVERTED (HISTORICAL) FINAL PATHOLOGIST       Diagnostician:  LORY ARMAS M.D.  Pathologist  Electronically Signed 03/03/2005     Results for orders placed or performed in visit on 10/03/02   Converted Surgical Pathology   Result Value Ref Range    Spec Descr 1 SPECIMEN(S): A RECTAL BIOPSY     Gross Description         GROSS DESCRIPTION:  The container is labeled \"rectum\" and has a fragment of white soft  tissue 0.4 cm in greatest dimension.  It is entirely embedded.      Final Diagnosis         FINAL DIAGNOSIS:  MUCOSA, RECTUM:              TUBULAR ADENOMA.            " pgl    DIAGNOSIS CODE:  8      Comment   CLINICAL DIAGNOSIS:  Rectal biopsy       CONVERTED (HISTORICAL) FINAL PATHOLOGIST       Diagnostician:  DEE JOY M.D.  Pathologist  Electronically Signed 10/07/2002

## 2019-01-23 NOTE — ANESTHESIA POSTPROCEDURE EVALUATION
Patient: Everett Rodriguez    Procedure Summary     Date:  01/23/19 Room / Location:  [facility] ENDOSCOPY 1 / [facility] ENDOSCOPY    Anesthesia Start:  1520 Anesthesia Stop:  1539    Procedure:  COLONOSCOPY (N/A ) Diagnosis:       Encounter for screening for malignant neoplasm of colon      (Encounter for screening for malignant neoplasm of colon [Z12.11])    Surgeon:  Marquez Art MD Provider:  Chitra Baldwin CRNA    Anesthesia Type:  MAC ASA Status:  3          Anesthesia Type: MAC  Last vitals  BP   168/84 (01/23/19 1453)   Temp   97.6 °F (36.4 °C) (01/23/19 1453)   Pulse   99 (01/23/19 1453)   Resp   16 (01/23/19 1453)     SpO2   96 % (01/23/19 1453)     Post Anesthesia Care and Evaluation    Patient location during evaluation: bedside  Patient participation: complete - patient participated  Level of consciousness: awake and awake and alert  Pain score: 0  Pain management: satisfactory to patient  Airway patency: patent  Anesthetic complications: No anesthetic complications  PONV Status: none  Cardiovascular status: acceptable and stable  Respiratory status: acceptable, room air and spontaneous ventilation  Hydration status: acceptable

## 2019-01-23 NOTE — ANESTHESIA PREPROCEDURE EVALUATION
Anesthesia Evaluation     Patient summary reviewed and Nursing notes reviewed   NPO Solid Status: > 8 hours  NPO Liquid Status: > 2 hours           Airway   Mallampati: III  TM distance: >3 FB  Neck ROM: full  No difficulty expected  Dental    (+) poor dentition    Pulmonary - negative pulmonary ROS and normal exam   Cardiovascular - normal exam    (+) hypertension, CAD, hyperlipidemia,       Neuro/Psych- negative ROS  GI/Hepatic/Renal/Endo    (+)   diabetes mellitus,     Musculoskeletal (-) negative ROS    Abdominal  - normal exam   Substance History   (+) alcohol use,      OB/GYN negative ob/gyn ROS         Other - negative ROS                       Anesthesia Plan    ASA 3     MAC     intravenous induction   Anesthetic plan, all risks, benefits, and alternatives have been provided, discussed and informed consent has been obtained with: patient.

## 2019-01-25 LAB
LAB AP CASE REPORT: NORMAL
PATH REPORT.FINAL DX SPEC: NORMAL
PATH REPORT.GROSS SPEC: NORMAL

## 2019-01-30 ENCOUNTER — OFFICE VISIT (OUTPATIENT)
Dept: GASTROENTEROLOGY | Facility: CLINIC | Age: 74
End: 2019-01-30

## 2019-01-30 VITALS
HEART RATE: 78 BPM | HEIGHT: 65 IN | SYSTOLIC BLOOD PRESSURE: 116 MMHG | BODY MASS INDEX: 38.82 KG/M2 | WEIGHT: 233 LBS | DIASTOLIC BLOOD PRESSURE: 62 MMHG

## 2019-01-30 DIAGNOSIS — D12.1 BENIGN NEOPLASM OF APPENDIX: ICD-10-CM

## 2019-01-30 DIAGNOSIS — D12.5 BENIGN NEOPLASM OF SIGMOID COLON: Primary | ICD-10-CM

## 2019-01-30 PROCEDURE — 99212 OFFICE O/P EST SF 10 MIN: CPT | Performed by: NURSE PRACTITIONER

## 2019-01-30 NOTE — PROGRESS NOTES
Chief Complaint   Patient presents with   • Colon Polyps       Subjective    Everett Rodriguez is a 73 y.o. male. he is here today for follow-up.    History of Present Illness  73-year-old male presents to discuss colonoscopy results.  He denies any abdominal pain, nausea, vomiting or changes in bowel habits.  He denies any melena or hematochezia.  Weight is stable.  Colonoscopy was completed 1/23/19 adequate prep was noted.  Perianal digital rectal exams were normal.  A few polyps were found and removed from sigmoid colon appendiceal orifice.  Cecal orifice of the appendix polyp was villotubular adenoma.  Sigmoid colon polyp was tubular adenoma.  Both polyps measuring 0.4 cc in greatest aggregate.  Plan; repeat colonoscopy in 3 years for surveillance due to villotubular polyp of appendix and adenomatous colonic polyp of sigmoid colon.  Follow-up in GI office sooner if needed.       The following portions of the patient's history were reviewed and updated as appropriate:   Past Medical History:   Diagnosis Date   • Coronary artery disease    • Diabetes mellitus (CMS/HCC)    • Hyperlipidemia    • Hypertension      Past Surgical History:   Procedure Laterality Date   • CARDIAC CATHETERIZATION     • CARDIAC SURGERY     • COLONOSCOPY  2008   • COLONOSCOPY N/A 1/23/2019    Procedure: COLONOSCOPY;  Surgeon: Marquez Art MD;  Location: [facility] ENDOSCOPY;  Service: Gastroenterology     No family history on file.    Current Outpatient Medications   Medication Sig Dispense Refill   • allopurinol (ZYLOPRIM) 300 MG tablet      • amLODIPine (NORVASC) 5 MG tablet      • atenolol (TENORMIN) 50 MG tablet Take 50 mg by mouth 2 (Two) Times a Day.     • atorvastatin (LIPITOR) 80 MG tablet Take 80 mg by mouth Daily.     • finasteride (PROSCAR) 5 MG tablet      • hydrALAZINE (APRESOLINE) 50 MG tablet Take 50 mg by mouth 3 (Three) Times a Day.     • hydrochlorothiazide (HYDRODIURIL) 25 MG tablet Take 25 mg by mouth Daily.     •  "losartan (COZAAR) 100 MG tablet      • metFORMIN (GLUCOPHAGE) 500 MG tablet      • nitroglycerin (NITROSTAT) 0.4 MG SL tablet      • tamsulosin (FLOMAX) 0.4 MG capsule 24 hr capsule      • temazepam (RESTORIL) 30 MG capsule Take 30 mg by mouth At Night As Needed for Sleep.     • warfarin (COUMADIN) 2.5 MG tablet        No current facility-administered medications for this visit.      No Known Allergies  Social History     Socioeconomic History   • Marital status: Single     Spouse name: Not on file   • Number of children: Not on file   • Years of education: Not on file   • Highest education level: Not on file   Tobacco Use   • Smoking status: Never Smoker   • Smokeless tobacco: Never Used   Substance and Sexual Activity   • Alcohol use: Yes     Comment: occasional   • Drug use: No       Review of Systems  Review of Systems   Constitutional: Negative for activity change, appetite change, chills, diaphoresis, fatigue, fever and unexpected weight change.   HENT: Negative for sore throat and trouble swallowing.    Respiratory: Negative for shortness of breath.    Gastrointestinal: Negative for abdominal distention, abdominal pain, anal bleeding, blood in stool, constipation, diarrhea, nausea, rectal pain and vomiting.   Musculoskeletal: Negative for arthralgias.   Skin: Negative for pallor.   Neurological: Negative for light-headedness.        /62 (BP Location: Left arm)   Pulse 78   Ht 165.1 cm (65\")   Wt 106 kg (233 lb)   BMI 38.77 kg/m²     Objective    Physical Exam   Constitutional: He is oriented to person, place, and time. He appears well-developed and well-nourished. He is cooperative. No distress.   HENT:   Head: Normocephalic and atraumatic.   Neck: Normal range of motion. Neck supple. No thyromegaly present.   Cardiovascular: Normal rate, regular rhythm and normal heart sounds.   Pulmonary/Chest: Effort normal and breath sounds normal. He has no wheezes. He has no rhonchi. He has no rales. "   Abdominal: Soft. Normal appearance and bowel sounds are normal. He exhibits no distension. There is no hepatosplenomegaly. There is no tenderness. There is no rigidity and no guarding. No hernia.   Lymphadenopathy:     He has no cervical adenopathy.   Neurological: He is alert and oriented to person, place, and time.   Skin: Skin is warm, dry and intact. No rash noted. No pallor.   Psychiatric: He has a normal mood and affect. His speech is normal.     Admission on 01/23/2019, Discharged on 01/23/2019   Component Date Value Ref Range Status   • Glucose 01/23/2019 112  70 - 130 mg/dL Final    Result Not ConfirmedOperator: [id number] CONRADO Goss ID: [id number]   • Case Report 01/23/2019    Final                    Value:Surgical Pathology Report                         Case: [id number]                                  Authorizing Provider:  Marquez Art MD        Collected:           01/23/2019 03:39 PM          Ordering Location:     [facility]             Received:            01/24/2019 08:44 AM                                 [facility]                                                     Pathologist:           Pancho Ferreira MD                                                         Specimens:   1) - Large Intestine, Appendix, polyp appeendix orfice                                              2) - Large Intestine, Sigmoid Colon, polyp                                                • Final Diagnosis 01/23/2019    Final                    Value:This result contains rich text formatting which cannot be displayed here.   • Gross Description 01/23/2019    Final                    Value:This result contains rich text formatting which cannot be displayed here.     Assessment/Plan      1. Benign neoplasm of sigmoid colon    2. Benign neoplasm of appendix    .       Orders placed during this encounter include:  No orders of the defined types were placed in this encounter.      * Surgery  "not found *    Review and/or summary of lab tests, radiology, procedures, medications. Review and summary of old records and obtaining of history. The risks and benefits of my recommendations, as well as other treatment options were discussed with the patient today. Questions were answered.    No orders of the defined types were placed in this encounter.      Follow-up: Return in about 3 years (around 1/30/2022).          This document has been electronically signed by YOHAN Spring on January 30, 2019 9:54 AM             Results for orders placed or performed during the hospital encounter of 01/23/19   Tissue Pathology Exam   Result Value Ref Range    Case Report       Surgical Pathology Report                         Case: [id number]                                  Authorizing Provider:  Marquez Art MD        Collected:           01/23/2019 03:39 PM          Ordering Location:     [facility]             Received:            01/24/2019 08:44 AM                                 [facility]                                                     Pathologist:           Pancho Ferreira MD                                                         Specimens:   1) - Large Intestine, Appendix, polyp appeendix orfice                                              2) - Large Intestine, Sigmoid Colon, polyp                                                 Final Diagnosis       1.  POLYP, CECAL ORIFICE OF THE APPENDIX:  VILLOTUBULAR ADENOMA.    2.  POLYP, SIGMOID COLON:  TUBULAR ADENOMA.      Gross Description       1.  The first container is labeled \"polyp, orifice of the appendix\" and has nodular bits of white soft tissue measuring 0.4 cc in aggregate.  The entire specimen is embedded as 1A.    2.  The second container is labeled \"polyp, sigmoid colon\" and has nodular bits of white soft tissue measuring 0.4 cc in aggregate.  The entire specimen is embedded as 2A.     POC Glucose Once   Result Value Ref " Range    Glucose 112 70 - 130 mg/dL   Results for orders placed or performed during the hospital encounter of 09/11/14   Urinalysis, Microscopic only   Result Value Ref Range    WBC, UA 50-60 (A) 0 - 5  /HPF    RBC, UA >100 (A) 0 - 2  /HPF    Bacteria, UA TRACE (A) 0  /HPF    Mucus, UA TRACE     Epithelial Cells, UA Rare Squamous  /HPF    Crystals, Joint Fluid None Observed  /HPF    Casts 0  /LPF   Urinalysis Without Microscopic   Result Value Ref Range    Color, UA RED     Appearance TURBID     Specific Gravity, UA 1.019 1.003 - 1.030    pH, UA 6.0 pH Units    Leukocytes, UA 3+ (A) NEGATIVE    Nitrite, UA POSITIVE (A) NEGATIVE    Protein, UA 3+ (A) NEGATIVE    Glucose, Urine NEGATIVE NEGATIVE mg/dl    Ketones, UA 2+ (A) NEGATIVE    Urobilinogen, UA 1.0 (A) 0.2 EU/dl    Blood, UA 3+ (A) NEGATIVE   APTT   Result Value Ref Range    PTT 30.2 22.5 - 36.7 seconds   Protime-INR   Result Value Ref Range    Protime 13.9 11.1 - 15.3 seconds    INR 1.1 0.0 - 3.5   CBC and Differential   Result Value Ref Range    WBC 13.8 (H) 3.2 - 9.8 x1000/uL    RBC 4.58 4.37 - 5.74 juan/mm3    Hemoglobin 14.6 13.7 - 17.3 gm/dl    Hematocrit 41.4 39.0 - 49.0 %    MCV 90.4 80.0 - 98.0 fl    MCH 31.9 26.0 - 34.0 pg    MCHC 35.3 31.5 - 36.3 gm/dl    RDW 13.0 11.5 - 14.5 %    Platelets 131 (L) 150 - 450 x1000/mm3    MPV 10.8 8.0 - 12.0 fl    Neutrophil Rel % 80.6 (H) 37.0 - 80.0 %    Lymphocyte Rel % 8.9 (L) 10.0 - 50.0 %    Monocyte Rel % 7.8 0.0 - 12.0 %    Eosinophil Rel % 1.6 0.0 - 7.0 %    Basophil Rel % 0.4 0.0 - 2.0 %    Immature Granulocyte Rel % 0.70 (H) 0.00 - 0.50 %    Neutrophils Absolute 11.13 (H) 2.00 - 8.60 x1000/uL    Lymphocytes Absolute 1.23 0.60 - 4.20 x1000/uL    Monocytes Absolute 1.08 (H) 0.00 - 0.90 x1000/uL    Eosinophils Absolute 0.22 0.00 - 0.70 x1000/uL    Basophils Absolute 0.05 0.00 - 0.20 x1000/uL    Immature Granulocytes Absolute 0.090 (H) 0.005 - 0.022 x1000/uL   Comprehensive metabolic panel   Result Value Ref  "Range    Sodium 142 137 - 145 mmol/L    Potassium 4.2 3.5 - 5.1 mmol/L    Chloride 96 95 - 110 mmol/L    CO2 32 (H) 22 - 31 mmol/L    Anion Gap 14.0 5.0 - 15.0 mmol/L    Glucose 147 (H) 60 - 100 mg/dl    BUN 32 (H) 7 - 21 mg/dl    Creatinine 1.2 0.7 - 1.3 mg/dl    GFR MDRD Non  60 49 - 113 mL/min/1.73 sq.M    GFR MDRD  73 49 - 113 mL/min/1.73 sq.M    Calcium 9.8 8.4 - 10.2 mg/dl    Total Protein 7.7 6.3 - 8.6 gm/dl    Albumin 4.1 3.4 - 4.8 gm/dl    Total Bilirubin 1.1 0.2 - 1.3 mg/dl    Alkaline Phosphatase 176 (H) 38 - 126 U/L    AST (SGOT) 98 (H) 17 - 59 U/L    ALT (SGPT) 89 (H) 21 - 72 U/L   Results for orders placed or performed in visit on 05/23/14   Converted Surgical Pathology   Result Value Ref Range    Spec Descr 1 SPECIMEN(S): A LESION LEFT INFRAORBITAL     Preoperative Diagnosis   PREOPERATIVE DIAGNOSIS:  None Given       Postoperative Diagnosis         POSTOPERATIVE DIAGNOSIS:  (L) infraorbital skin lesion      Gross Description         GROSS DESCRIPTION:  The specimen consists of an oval shaped skin from the left infraorbital  area measuring 2.0 x 1.5 cm and cut to a depth of 1.0 cm.  The skin  surface shows a raised firm grayish tan nodule measuring 1.0 cm in  greatest dimension and is about 0.2 cm away from the closest line of  excision.  Serially sectioned and all embedded.      Final Diagnosis         FINAL DIAGNOSIS:  SKIN, LEFT INFRAORBITAL AREA, EXCISIONAL BIOPSY:       SEBACEOUS ADENOMA.      CONVERTED (HISTORICAL) FINAL PATHOLOGIST       Diagnostician:  CHRISTIANO JOHN M.D.  Pathologist  Electronically Signed 05/28/2014      Diagnosis Code   DIAGNOSIS CODE:  8      Results for orders placed or performed in visit on 03/01/05   Converted Surgical Pathology   Result Value Ref Range    Spec Descr 1 SPECIMEN(S): A CYST     Gross Description         GROSS DESCRIPTION:  The specimen is labeled \"#1 cyst, right thumb\" and consists of a  fusiform skin fragment measuring " "1.3 x 0.6 x 0.4 cm.  This is serially  sectioned and entirely submitted in 1 block after the margins are marked  with ink.  Two fragments of gray-tan tissue measure 0.5 x 0.4 x 0.3 cm  each.  These are included without sectioning in the same block.      Final Diagnosis         FINAL DIAGNOSIS:  SKIN, RIGHT THUMB:            DIGITAL MUCOUS CYST.                           [facility] [place]    DIAGNOSIS CODE:  3      Comment   CLINICAL DIAGNOSIS:  Cyst       CONVERTED (HISTORICAL) FINAL PATHOLOGIST       Diagnostician:  LORY ARMAS M.D.  Pathologist  Electronically Signed 03/03/2005     Results for orders placed or performed in visit on 10/03/02   Converted Surgical Pathology   Result Value Ref Range    Spec Descr 1 SPECIMEN(S): A RECTAL BIOPSY     Gross Description         GROSS DESCRIPTION:  The container is labeled \"rectum\" and has a fragment of white soft  tissue 0.4 cm in greatest dimension.  It is entirely embedded.      Final Diagnosis         FINAL DIAGNOSIS:  MUCOSA, RECTUM:              TUBULAR ADENOMA.            pgl    DIAGNOSIS CODE:  8      Comment   CLINICAL DIAGNOSIS:  Rectal biopsy       CONVERTED (HISTORICAL) FINAL PATHOLOGIST       Diagnostician:  DEE JOY M.D.  Pathologist  Electronically Signed 10/07/2002       "

## 2019-01-30 NOTE — PATIENT INSTRUCTIONS
Colon Polyps  Polyps are tissue growths inside the body. Polyps can grow in many places, including the large intestine (colon). A polyp may be a round bump or a mushroom-shaped growth. You could have one polyp or several.  Most colon polyps are noncancerous (benign). However, some colon polyps can become cancerous over time.  What are the causes?  The exact cause of colon polyps is not known.  What increases the risk?  This condition is more likely to develop in people who:  · Have a family history of colon cancer or colon polyps.  · Are older than 50 or older than 45 if they are .  · Have inflammatory bowel disease, such as ulcerative colitis or Crohn disease.  · Are overweight.  · Smoke cigarettes.  · Do not get enough exercise.  · Drink too much alcohol.  · Eat a diet that is:  ? High in fat and red meat.  ? Low in fiber.  · Had childhood cancer that was treated with abdominal radiation.    What are the signs or symptoms?  Most polyps do not cause symptoms. If you have symptoms, they may include:  · Blood coming from your rectum when having a bowel movement.  · Blood in your stool. The stool may look dark red or black.  · A change in bowel habits, such as constipation or diarrhea.    How is this diagnosed?  This condition is diagnosed with a colonoscopy. This is a procedure that uses a lighted, flexible scope to look at the inside of your colon.  How is this treated?  Treatment for this condition involves removing any polyps that are found. Those polyps will then be tested for cancer. If cancer is found, your health care provider will talk to you about options for colon cancer treatment.  Follow these instructions at home:  Diet  · Eat plenty of fiber, such as fruits, vegetables, and whole grains.  · Eat foods that are high in calcium and vitamin D, such as milk, cheese, yogurt, eggs, liver, fish, and broccoli.  · Limit foods high in fat, red meats, and processed meats, such as hot dogs, sausage,  murray, and lunch meats.  · Maintain a healthy weight, or lose weight if recommended by your health care provider.  General instructions  · Do not smoke cigarettes.  · Do not drink alcohol excessively.  · Keep all follow-up visits as told by your health care provider. This is important. This includes keeping regularly scheduled colonoscopies. Talk to your health care provider about when you need a colonoscopy.  · Exercise every day or as told by your health care provider.  Contact a health care provider if:  · You have new or worsening bleeding during a bowel movement.  · You have new or increased blood in your stool.  · You have a change in bowel habits.  · You unexpectedly lose weight.  This information is not intended to replace advice given to you by your health care provider. Make sure you discuss any questions you have with your health care provider.  Document Released: 09/13/2005 Document Revised: 05/25/2017 Document Reviewed: 11/07/2016  [employer] Patient Education © 2018 [employer]

## 2019-12-23 ENCOUNTER — TRANSCRIBE ORDERS (OUTPATIENT)
Dept: ADMINISTRATIVE | Facility: HOSPITAL | Age: 74
End: 2019-12-23

## 2019-12-23 DIAGNOSIS — I25.10 CORONARY ARTERIOSCLEROSIS: Primary | ICD-10-CM

## 2019-12-27 ENCOUNTER — TRANSCRIBE ORDERS (OUTPATIENT)
Dept: CARDIOLOGY | Facility: CLINIC | Age: 74
End: 2019-12-27

## 2019-12-27 DIAGNOSIS — I25.10 CORONARY ARTERIOSCLEROSIS: Primary | ICD-10-CM

## 2021-01-01 ENCOUNTER — BULK ORDERING (OUTPATIENT)
Dept: CASE MANAGEMENT | Facility: OTHER | Age: 76
End: 2021-01-01

## 2021-01-01 DIAGNOSIS — Z23 IMMUNIZATION DUE: ICD-10-CM

## (undated) DEVICE — PAD GRND REM POLYHESIVE A/ DISP

## (undated) DEVICE — Device: Brand: DISPOSABLE ELECTROSURGICAL SNARE

## (undated) DEVICE — TRAP SXN POLYP QUICKCATCH LF